# Patient Record
Sex: FEMALE | Employment: FULL TIME | URBAN - METROPOLITAN AREA
[De-identification: names, ages, dates, MRNs, and addresses within clinical notes are randomized per-mention and may not be internally consistent; named-entity substitution may affect disease eponyms.]

---

## 2021-10-25 ENCOUNTER — CLINICAL SUPPORT (OUTPATIENT)
Dept: OTHER | Facility: CLINIC | Age: 51
End: 2021-10-25

## 2021-10-25 DIAGNOSIS — Z00.8 ENCOUNTER FOR OTHER GENERAL EXAMINATION: ICD-10-CM

## 2021-10-26 VITALS — HEIGHT: 68 IN

## 2021-10-26 LAB
HDLC SERPL-MCNC: 49 MG/DL
POC CHOLESTEROL, LDL (DOCK): 155 MG/DL
POC CHOLESTEROL, TOTAL: 224 MG/DL
POC GLUCOSE, FASTING: 83 MG/DL (ref 60–110)
TRIGL SERPL-MCNC: 103 MG/DL

## 2025-01-27 DIAGNOSIS — H40.9 GLAUCOMA: Primary | ICD-10-CM

## 2025-05-01 ENCOUNTER — OFFICE VISIT (OUTPATIENT)
Dept: OPHTHALMOLOGY | Facility: CLINIC | Age: 55
End: 2025-05-01
Payer: COMMERCIAL

## 2025-05-01 DIAGNOSIS — H25.12 NUCLEAR SCLEROSIS OF LEFT EYE: ICD-10-CM

## 2025-05-01 DIAGNOSIS — H40.033 ANATOMICAL NARROW ANGLE OF BOTH EYES: Primary | ICD-10-CM

## 2025-05-01 DIAGNOSIS — H25.11 NUCLEAR SCLEROSIS OF RIGHT EYE: ICD-10-CM

## 2025-05-01 DIAGNOSIS — H40.023 OPEN ANGLE WITH BORDERLINE FINDINGS AND HIGH GLAUCOMA RISK IN BOTH EYES: ICD-10-CM

## 2025-05-01 PROCEDURE — 99999 PR PBB SHADOW E&M-EST. PATIENT-LVL II: CPT | Mod: PBBFAC,,, | Performed by: STUDENT IN AN ORGANIZED HEALTH CARE EDUCATION/TRAINING PROGRAM

## 2025-05-01 RX ORDER — AMLODIPINE BESYLATE 10 MG/1
10 TABLET ORAL
COMMUNITY
Start: 2025-03-05

## 2025-05-01 RX ORDER — KETOROLAC TROMETHAMINE 10 MG/1
10 TABLET, FILM COATED ORAL 3 TIMES DAILY
COMMUNITY
Start: 2024-11-05

## 2025-05-01 RX ORDER — FLUTICASONE PROPIONATE 50 MCG
SPRAY, SUSPENSION (ML) NASAL
COMMUNITY
Start: 2024-10-18

## 2025-05-01 RX ORDER — ATORVASTATIN CALCIUM 10 MG/1
10 TABLET, FILM COATED ORAL
COMMUNITY
Start: 2024-12-02

## 2025-05-01 RX ORDER — AMOXICILLIN 875 MG/1
TABLET, FILM COATED ORAL
COMMUNITY
Start: 2024-10-18

## 2025-05-01 RX ORDER — ERGOCALCIFEROL 1.25 MG/1
50000 CAPSULE ORAL
COMMUNITY
Start: 2025-03-03

## 2025-05-01 NOTE — PROGRESS NOTES
HPI    Patient states vision is blurry & states she was prescribed drops to use   at night years ago. Denies any pain or discomfort. No other ocular   complaints   Last edited by Wesley Lorenz on 5/1/2025  2:48 PM.            Assessment /Plan     For exam results, see Encounter Report.    Anatomical narrow angle of both eyes  - IOP normal. Angles narrow on gonioscopy but open with dynamic gonioscopy. Explained r/b/a to observation vs. LPI vs. CEIOL. Patient elects for LPI .  - Plan for CEIOL OD then OS    Glaucoma suspect, high risk, bilateral- Risk factors: +Fhx, Enlarged C/D Ratio, and AA race  GOCT WNL  Will follow with serial GOCTs    Explained that the diagnosis is uncertain and further testing is indicated. Discussed importance of follow up and completion of indicated studies as well as the risk of blindness from untreated/undiagnosed glaucoma.      Nuclear sclerosis of right eye  Nuclear sclerosis of left eye- - Not visually significant, monitor        RTC next aval. For LPI OD

## 2025-06-27 ENCOUNTER — OFFICE VISIT (OUTPATIENT)
Dept: OPHTHALMOLOGY | Facility: CLINIC | Age: 55
End: 2025-06-27
Payer: COMMERCIAL

## 2025-06-27 DIAGNOSIS — H40.031 ANATOMICAL NARROW ANGLE OF RIGHT EYE: Primary | ICD-10-CM

## 2025-06-27 PROCEDURE — 99999 PR PBB SHADOW E&M-EST. PATIENT-LVL II: CPT | Mod: PBBFAC,,, | Performed by: STUDENT IN AN ORGANIZED HEALTH CARE EDUCATION/TRAINING PROGRAM

## 2025-06-27 RX ORDER — PREDNISOLONE ACETATE 10 MG/ML
1 SUSPENSION/ DROPS OPHTHALMIC EVERY 4 HOURS
Qty: 5 ML | Refills: 0 | Status: SHIPPED | OUTPATIENT
Start: 2025-06-27 | End: 2025-07-04

## 2025-06-27 NOTE — PROGRESS NOTES
HPI     Procedure     Additional comments: Pt reports for LPI right eye.          Last edited by Tashia Pearson on 6/27/2025 10:23 AM.            Assessment /Plan     For exam results, see Encounter Report.    Anatomical narrow angle of right eye- Procedure Note:    Pre-op Diagnosis : Anatomic Narrow Angle Glaucoma right Eye    Post-op Diagnosis : Anatomic Narrow Angle Glaucoma right Eye    Surgeon: Louisa Brothers M.D.    Complications : None    Time Out Sheet : completed and signed    After obtaining informed consent, the patient was positioned behind the argon laser:    113 bursts  1500 mW  .02seconds  50 micron spot size    After obtaining informed consent, the patient was positioned behind the Yag laser:    32 Bursts  1:1 ratio  1.4 mJoules        Patent PI was created at the conclusion of the procedure.   Discharge medications: Prednisolone Acetate 1% four times a day to the operative eye        RTC 2-3 week LPI OS

## 2025-06-30 ENCOUNTER — TELEPHONE (OUTPATIENT)
Dept: OPHTHALMOLOGY | Facility: CLINIC | Age: 55
End: 2025-06-30
Payer: COMMERCIAL

## 2025-06-30 DIAGNOSIS — H40.031 ANATOMICAL NARROW ANGLE OF RIGHT EYE: ICD-10-CM

## 2025-06-30 RX ORDER — PREDNISOLONE ACETATE 10 MG/ML
1 SUSPENSION/ DROPS OPHTHALMIC EVERY 4 HOURS
Qty: 5 ML | Refills: 0 | Status: SHIPPED | OUTPATIENT
Start: 2025-06-30 | End: 2025-07-07

## 2025-06-30 NOTE — TELEPHONE ENCOUNTER
RX was sent to the wrong pharmacy.       ----- Message from Colleen sent at 6/30/2025 10:29 AM CDT -----  Pt states she never eye drops that was suppose to be sent over last week please resend to [facility] [phone] once sent call to confirm  [phone]

## 2025-07-22 ENCOUNTER — TELEPHONE (OUTPATIENT)
Dept: OPHTHALMOLOGY | Facility: CLINIC | Age: 55
End: 2025-07-22
Payer: COMMERCIAL

## 2025-07-22 NOTE — TELEPHONE ENCOUNTER
Spoke with patient and informed her that Daxa will be giving her a call back regarding rather or not her appt will be canceled.

## 2025-07-22 NOTE — TELEPHONE ENCOUNTER
----- Message from Colleen sent at 7/22/2025  8:15 AM CDT -----  LPI OS 2-3wk  Sched per Daxa   Pt would like to know if  was canceling her up coming appt for Thursday she states someone called this morning but did not leave a message please advise if  Is booking out  [phone]